# Patient Record
Sex: FEMALE | Race: WHITE | NOT HISPANIC OR LATINO | URBAN - METROPOLITAN AREA
[De-identification: names, ages, dates, MRNs, and addresses within clinical notes are randomized per-mention and may not be internally consistent; named-entity substitution may affect disease eponyms.]

---

## 2022-10-04 ENCOUNTER — EMERGENCY (EMERGENCY)
Facility: HOSPITAL | Age: 27
LOS: 1 days | Discharge: HOME | End: 2022-10-04
Admitting: STUDENT IN AN ORGANIZED HEALTH CARE EDUCATION/TRAINING PROGRAM
Payer: COMMERCIAL

## 2022-10-04 VITALS
DIASTOLIC BLOOD PRESSURE: 82 MMHG | OXYGEN SATURATION: 98 % | HEART RATE: 108 BPM | SYSTOLIC BLOOD PRESSURE: 130 MMHG | TEMPERATURE: 99 F | RESPIRATION RATE: 16 BRPM

## 2022-10-04 VITALS
HEIGHT: 60 IN | DIASTOLIC BLOOD PRESSURE: 80 MMHG | HEART RATE: 110 BPM | OXYGEN SATURATION: 98 % | TEMPERATURE: 99 F | RESPIRATION RATE: 16 BRPM | WEIGHT: 126.99 LBS | SYSTOLIC BLOOD PRESSURE: 139 MMHG

## 2022-10-04 PROCEDURE — 99283 EMERGENCY DEPT VISIT LOW MDM: CPT

## 2022-10-04 RX ORDER — DIPHENHYDRAMINE HYDROCHLORIDE AND LIDOCAINE HYDROCHLORIDE AND ALUMINUM HYDROXIDE AND MAGNESIUM HYDRO
10 KIT ONCE
Refills: 0 | Status: COMPLETED | OUTPATIENT
Start: 2022-10-04 | End: 2022-10-04

## 2022-10-04 RX ADMIN — DIPHENHYDRAMINE HYDROCHLORIDE AND LIDOCAINE HYDROCHLORIDE AND ALUMINUM HYDROXIDE AND MAGNESIUM HYDRO 10 MILLILITER(S): KIT at 12:08

## 2022-10-04 NOTE — ED ADULT TRIAGE NOTE - CHIEF COMPLAINT QUOTE
Pt presents to the ED from  for evaluation of sore throat that began a week and a half ago. Pt diagnosed w/ mono at urgent care on mono spot test. Pt denies fever, chills, CP, SOB, headache.

## 2022-10-04 NOTE — ED PROVIDER NOTE - PATIENT PORTAL LINK FT
You can access the FollowMyHealth Patient Portal offered by [facility] by registering at the following website: [email or web address]. By joining [employer]’s FollowMyHealth portal, you will also be able to view your health information using other applications (apps) compatible with our system.

## 2022-10-04 NOTE — ED PROVIDER NOTE - NSFOLLOWUPINSTRUCTIONS_ED_ALL_ED_FT
Do not take NSAID (ie Advil) while taking prednisone (steroid).  Use the Magic Mouthwash - 5-10 mL (1-2 teaspoons) every 4-6 hours (swish and swallow).  Follow up with your doctor in 1-2 days.  Return to the Emergency Department if you have any new or worsening symptoms, or if you have any concerns.  =====================    Infectious Mononucleosis      Infectious mononucleosis is a viral infection. It is often referred to as "mono." It causes symptoms that affect various areas of the body, including the throat, upper air passages, and lymph glands. The liver or spleen may also be affected.    The virus spreads from person to person (is contagious) through close contact. The illness is usually not serious, and it typically goes away in 2–4 weeks without treatment. In rare cases, symptoms can be more severe and last longer, sometimes up to several months.      What are the causes?    This condition is commonly caused by the Chip–Barr virus (EBV). This virus spreads through:•Having contact with an infected person's saliva or other bodily fluids, often through:  •Kissing.    •Sex.    •Coughing.    •Sneezing.    •Sharing utensils or drinking glasses with an infected person.    •Receiving blood from an infected donor (blood transfusion).    •Receiving an organ from an infected donor (organ transplant).      What increases the risk?    You are more likely to develop this condition if:  •You are 15–24 years old.        What are the signs or symptoms?  Side view of the head and neck showing normal and swollen glands.   Symptoms of this condition usually appear 4–6 weeks after infection. Symptoms may develop slowly and occur at different times. Common symptoms include:    Mild symptoms of this condition include:  •Headache.    •Muscle aches.    •Swollen glands.    •Poor appetite.      Moderate symptoms of this condition include:  •Sore throat.    •Fever.    •Rash.    •Nausea    Other symptoms may include:  •Extreme fatigue.    •Enlarged liver or spleen.    •Abdominal pain.        How is this diagnosed?    This condition may be diagnosed based on:  •Your medical history.    •Your symptoms.    •A physical exam.    •Blood tests to confirm the diagnosis.        How is this treated?    There is no cure for this condition. Infectious mononucleosis usually goes away on its own with time. Treatment can help relieve symptoms and may include:  •Drinking plenty of fluids.    •Getting a lot of rest.    •Taking medicines to relieve pain and fever.    •Medicine (corticosteroids) to reduce swelling. This may be used if swelling in the throat causes breathing or swallowing problems.    In some severe cases, treatment may have to be given in a hospital.      Follow these instructions at home:    Medicines     •Take over-the-counter and prescription medicines only as told by your health care provider.    • Do not take the antibiotics ampicillin or amoxicillin. This may cause a rash.    •If you are under 18, do not take aspirin because of the association with Reye's syndrome.      Activity     •Rest as needed.    • Do not participate in any of the following activities until your health care provider approves:  •Exercise that requires a lot of energy.    •Heavy lifting.    •Contact sports. You may need to wait at least a month before participating in sports.    •Gradually resume your normal activities after your fever is gone, or when your health care provider tells you that you can. Be sure to rest when you get tired.        General instructions   Three cups showing dark yellow, yellow, and pale yellow urine.   •Avoid kissing or sharing utensils or drinking glasses until your health care provider tells you that you are no longer contagious.    •Drink enough fluid to keep your urine pale yellow.    • Do not drink alcohol.    •If you have a sore throat:  •Gargle with a salt-water mixture 3–4 times a day or as needed. To make a salt-water mixture, completely dissolve ½–1 tsp (3–6 g) of salt in 1 cup (237 mL) of warm water.    •Eat soft foods. Cold foods such as ice cream or ice pops can soothe a sore throat.    •Try sucking on hard candy or lozenges.    •Keep all follow-up visits. This is important.        How is this prevented?  Hands being washed with soap and water at sink.   •Avoid contact with people who are infected with mononucleosis. An infected person may not always appear ill, but he or she can still spread the virus.    •Avoid sharing utensils, drinking glasses, or toothbrushes.    •Wash your hands frequently for at least 20 seconds with soap and water. If soap and water are not available, use hand .    •Use the inside of your elbow to cover your mouth when coughing or sneezing.        Where to find more information    Centers for Disease Control and Prevention: www.cdc.gov      Contact a health care provider if:    •Your fever is not gone after 10 days.    •You have swollen lymph nodes that are not back to normal after 4 weeks.    •Your activity level is not back to normal after 2 months.    •Your skin or the white parts of your eyes turn yellow (jaundice).    •You have constipation. You may have constipation if you are having:  •Fewer bowel movements in a week than normal.    •Difficulty passing stool.    •Stools that are dry, hard, or larger than normal.          Get help right away if:    •You cannot stop vomiting.    •You are drooling or have trouble swallowing.    •You have signs of dehydration. These may include:  •Weakness.    •Pale skin.    •Sunken eyes or dry mouth.    •Rapid breathing or pulse.    •You have trouble breathing.    •You develop a stiff neck or a severe headache.    •You have severe pain in your abdomen or shoulder.    •You are confused or you have trouble with balance.    •You have jerky movements that you cannot control (seizures).    •Your nose or gums begin to bleed.      Some of these symptoms may represent a serious problem that is an emergency. Do not wait to see if the symptoms will go away. Get medical help right away. Call your local emergency services (911 in the [place] Do not drive yourself to the hospital.       Summary    •Infectious mononucleosis, or "mono," is an infection caused by the Chip–Barr virus.    •The virus that causes this condition is spread through bodily fluids. The virus is most commonly spread by kissing or sharing drinks or utensils with an infected person.    •You are more likely to develop this condition if you are 15–24 years old.    •Symptoms of this condition include sore throat, headache, fever, swollen glands, muscle aches, and extreme fatigue.    •There is no cure for this condition. Treatment can help relieve symptoms and may include drinking plenty of fluids, getting a lot of rest, or taking medicines.    This information is not intended to replace advice given to you by your health care provider. Make sure you discuss any questions you have with your health care provider.

## 2022-10-04 NOTE — ED PROVIDER NOTE - OBJECTIVE STATEMENT
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  Seen at [facility], had negative rapid strep test and throat culture; tested positive for mononucleosis.  Has had persistent throat pain on swallowing, especially at night and in the morning.  Drinking less than usual because of the pain, but is able to swallow.  Says urine slightly darker than usual this morning, but no dysuria/frequency/hematuria; she increased oral hydration after noticing the dark urine, and says already the urine appears lighter.  No fever/chills, headache, cough/CP/SOB, abd pain, NVD, rash.  Taking Advil Cold and Flu with little relief, the throat pain is keeping her up at night, also using colloidal silver, tea, honey without improvement.  Seen again at  today, sent to ED for further evaluation.

## 2022-10-04 NOTE — ED PROVIDER NOTE - PHYSICAL EXAMINATION
CONSTITUTIONAL: NAD   SKIN: Normal color and turgor.    HEAD: NC/AT.  EYES: Conjunctiva clear. EOMI. PERRL.    ENT:  No drooling. Normal voice.  Bilateral red, hypertrophic, cryptic tonsils with exudates.  Uvula midline without swelling. + submandibular and cervical ZELALEM.  TM pearly, clear, noninjected, normal COL bilat.  RESPIRATORY:  Normal work of breathing. Lungs CTAB.  CARDIOVASCULAR:  RRR, S1S2. No M/R/G.      GI:  Abdomen soft, nontender.  No HSM appreciated on palpation and percussion.  MSK: Neck supple.  No LE edema or calf tenderness. No joint swelling or ROM limitation.  NEURO: Alert; CN: grossly intact. Speech clear.  HUNTER. Gait steady.

## 2022-10-04 NOTE — ED PROVIDER NOTE - CLINICAL SUMMARY MEDICAL DECISION MAKING FREE TEXT BOX
Mononucleosis: tested negative for strep throat at .  Airway patent, bilat enlarged tonsils without concern for airway obstruction.  No clinical evidence of PTA.  Appears nontoxic. HD stable.  No HSM on exam, given abdominal precautions due to risk of developing splenomegaly from mono.  Discussed and offered further testing with blood work for LFTs, which patient declined.  I do not feel strongly that such testing would change course of illness or treatment.  She also declined IV fluids and steroids in ED.  Will do pregnancy test, switch to steroid for better control of tonsillar swelling and pain, and try Magic Mouthwash; advised stopping NSAID during steroid tx.  Advised avoidance of activities that could result in abdominal trauma.  Pt has a PCP and was advised to follow up with them in the next 1-2 days.

## 2022-10-06 DIAGNOSIS — J02.9 ACUTE PHARYNGITIS, UNSPECIFIED: ICD-10-CM

## 2022-10-06 DIAGNOSIS — B27.90 INFECTIOUS MONONUCLEOSIS, UNSPECIFIED WITHOUT COMPLICATION: ICD-10-CM
